# Patient Record
Sex: MALE
[De-identification: names, ages, dates, MRNs, and addresses within clinical notes are randomized per-mention and may not be internally consistent; named-entity substitution may affect disease eponyms.]

---

## 2018-03-29 ENCOUNTER — HOSPITAL ENCOUNTER (EMERGENCY)
Dept: HOSPITAL 14 - H.ER | Age: 43
Discharge: HOME | End: 2018-03-29
Payer: COMMERCIAL

## 2018-03-29 VITALS
RESPIRATION RATE: 16 BRPM | OXYGEN SATURATION: 100 % | TEMPERATURE: 98.3 F | HEART RATE: 59 BPM | SYSTOLIC BLOOD PRESSURE: 104 MMHG | DIASTOLIC BLOOD PRESSURE: 64 MMHG

## 2018-03-29 DIAGNOSIS — M43.6: Primary | ICD-10-CM

## 2018-03-29 PROCEDURE — 72040 X-RAY EXAM NECK SPINE 2-3 VW: CPT

## 2018-03-29 PROCEDURE — 96372 THER/PROPH/DIAG INJ SC/IM: CPT

## 2018-03-29 PROCEDURE — 99283 EMERGENCY DEPT VISIT LOW MDM: CPT

## 2018-03-29 NOTE — ED PDOC
HPI: Back


Time Seen by Provider: 03/29/18 21:30


Chief Complaint (Nursing): Back Pain


Chief Complaint (Provider): Neck Pain


History Per: Patient


History/Exam Limitations: no limitations


Onset/Duration Of Symptoms: Days


Current Symptoms Are (Timing): Still Present


Previous Symptoms: Back Pain, Neck Pain


Additional Complaint(s): 


42-year-old male presents to the emergency department complaining of neck pain, 

worsened since last week following a chiropractic appointment. Pain is 

described as pressure-like, and has not been relieved with Aspirin. Patient 

reports having neck pain in the past, as well as low back pain, and has been 

having chiropractic adjustments for 3 weeks. He states he was previously 

diagnosed with herniated discs but has not had an MRI in years. No changes in 

sensation, focal deficits, fever or chills. 





PMD: None  





Past Medical History


Reviewed: Historical Data, Nursing Documentation, Vital Signs


Vital Signs: 


 Last Vital Signs











Temp  98.3 F   03/29/18 21:23


 


Pulse  59 L  03/29/18 21:23


 


Resp  16   03/29/18 21:23


 


BP  104/64   03/29/18 21:23


 


Pulse Ox  100   03/29/18 21:23














- Medical History


PMH: Back Problems (herniated disc)





- Surgical History


Surgical History: Cholecystectomy





- Family History


Family History: States: Unknown Family Hx





- Social History


Current smoker - smoking cessation education provided: No


Alcohol: None


Drugs: Denies





- Home Medications


Home Medications: 


 Ambulatory Orders











 Medication  Instructions  Recorded


 


Methylprednisolone [Medrol Dose 4 mg PO ASDIR #21 mg 03/29/18





Pack (21 tabs)]  


 


Nabumetone [Relafen] 500 mg PO BID #20 tab 03/29/18


 


tiZANidine [Zanaflex] 4 mg PO Q8H PRN #20 tab 03/29/18














- Allergies


Allergies/Adverse Reactions: 


 Allergies











Allergy/AdvReac Type Severity Reaction Status Date / Time


 


No Known Allergies Allergy   Verified 03/29/18 21:23














Review of Systems


ROS Statement: Except As Marked, All Systems Reviewed And Found Negative


Constitutional: Negative for: Fever, Chills


Musculoskeletal: Positive for: Neck Pain


Neurological: Negative for: Weakness, Numbness





Physical Exam





- Reviewed


Nursing Documentation Reviewed: Yes


Vital Signs Reviewed: Yes





- Physical Exam


Appears: Positive for: Well, Non-toxic, No Acute Distress


Head Exam: Positive for: ATRAUMATIC, NORMAL INSPECTION, NORMOCEPHALIC


Skin: Positive for: Normal Color.  Negative for: Rash


Eye Exam: Positive for: Normal appearance


Neck: Positive for: Decreased ROM (with diffuse tenderness to the c-spine)


Neurologic/Psych: Positive for: Alert, Oriented





- ECG


O2 Sat by Pulse Oximetry: 100 (RA)


Pulse Ox Interpretation: Normal





- Other Rad


  ** cervical spine x-ry


X-Ray: Interpreted by Me, Viewed By Me


X-Ray Interpretation: no fx, no dis, muscle spasm





Medical Decision Making


Medical Decision Making: 


Impression: 42 year old with torticollis 





Time: 21:35





Plan:


PO Tylenol


IM Toradol


PO Valium


X-ray c-spine





Patient is aware of x-ray results, all questions answered.  He states pain has 

improved s/p meds given.  Will d/c with rx medrol dose pack, tizanidine and 

relafen.  Patient was instructed to follow up with orthopedist, referral 

provided.





--------------------------------------------------------------------------------

-----------------


Scribe Attestation:   


Documented by Pippa Sue, acting as a scribe for Dawn Fitzpatrick PA-C





Provider Scribe Attestation:


All medical record entries made by the Scribe were at my direction and 

personally dictated by me. I have reviewed the chart and agree that the record 

accurately reflects my personal performance of the history, physical exam, 

medical decision making, and the department course for this patient. I have 

also personally directed, reviewed, and agree with the discharge instructions 

and disposition.





Disposition





- Clinical Impression


Clinical Impression: 


 Torticollis





Counseled Patient/Family Regarding: Studies Performed, Diagnosis, Need For 

Followup, Rx Given





- Disposition


Referrals: 


Ishaan Norman MD [Staff Provider] - 


Disposition: Routine/Home


Disposition Time: 22:28


Condition: STABLE


Additional Instructions: 


TAKE RX MEDS AS DIRECTED.  FOLLOW UP WITH ORTHOPEDIST IN 2-3 DAYS 


Prescriptions: 


Methylprednisolone [Medrol Dose Pack (21 tabs)] 4 mg PO ASDIR #21 mg


Nabumetone [Relafen] 500 mg PO BID #20 tab


tiZANidine [Zanaflex] 4 mg PO Q8H PRN #20 tab


 PRN Reason: Muscle Pain


Instructions:  Torticollis, Adult


Forms:  [facility] (English), [facility] ED School/Work Excuse

## 2018-03-30 NOTE — RAD
PROCEDURE:  Cervical Spine Radiographs.



HISTORY:

Pain. 



COMPARISON:

Correlations made to CT scan of cervical spine dated 08/23/2010. 



FINDINGS:



BONES:

Alignment maintained. No fracture.  Dens Intact. 



DISC SPACES:

Normal. 



SOFT TISSUES:

Normal. No prevertebral soft tissue swelling. 



OTHER FINDINGS:

None.



IMPRESSION:

No acute fracture